# Patient Record
(demographics unavailable — no encounter records)

---

## 2025-05-22 NOTE — HISTORY OF PRESENT ILLNESS
[Never] : never [TextBox_4] : History obtained through [employer] . 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 Previously took prednisone, now no longer taking it. Has had recurrent bronchitis over the years.  symptoms include mucous production in her chest, 3 months ago had difficulty breathing went to [place] and they gave her ICS-LABA, fluticasone and albuterol.   Has nose bleeds with mucous, and sometimes coughs up blood tinged mucous this occurs when she is very congested. Today she is experiencing blood tinged mucous from her nose and coughing it up as well.   Has seasonal allergies. Has seen allergist and was skin tested- allergies to pollen, dust, feathers, dogs and cat hair. Waiting for insurance approval for desensitization therapy.   experiences wheezing at night, possible edema, when she walks quickly she has dyspnea and on climbing stairs.    Born, [facility], in  for [place] years.  Had asthma as a child. Had TB 20 years ago she was treated for many months with many pills, cannot name them. Has seen ENT- had surgery for sinusitis in the past.  ENT said sinuses were OK.   Works as a seamstress.

## 2025-05-22 NOTE — HISTORY OF PRESENT ILLNESS
[Never] : never [TextBox_4] : History obtained through [employer] . 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 Previously took prednisone, now no longer taking it. Has had recurrent bronchitis over the years.  symptoms include mucous production in her chest, 3 months ago had difficulty breathing went to [place] and they gave her ICS-LABA, fluticasone and albuterol.   Has nose bleeds with mucous, and sometimes coughs up blood tinged mucous this occurs when she is very congested. Today she is experiencing blood tinged mucous from her nose and coughing it up as well.   Has seasonal allergies. Has seen allergist and was skin tested- allergies to pollen, dust, feathers, dogs and cat hair. Waiting for insurance approval for desensitization therapy.   experiences wheezing at night, possible edema, when she walks quickly she has dyspnea and on climbing stairs.    Born, [facility], in  for 35 years.  Had asthma as a child. Had TB 20 years ago she was treated for many months with many pills, cannot name them. Has seen ENT- had surgery for sinusitis in the past.  ENT said sinuses were OK.   Works as a seamstress.

## 2025-05-22 NOTE — END OF VISIT
[Time Spent: ___ minutes] : I have spent [unfilled] minutes of time on the encounter which excludes teaching and separately reported services. [FreeTextEntry3] :  Total time spent caring for the patient today was 60   minutes.  This includes time spent before the visit reviewing the chart, during the visit speaking to the patient through a  and performing an examination, personally reviewing PFTS and time spent after the visit on documentation,

## 2025-05-22 NOTE — REVIEW OF SYSTEMS
[Nasal Congestion] : nasal congestion [Cough] : cough [Sputum] : sputum [Hay Fever] : hay fever [Negative] : Gastrointestinal

## 2025-05-22 NOTE — ASSESSMENT
[FreeTextEntry1] : 64-year-old woman with history of asthma as a child and recurrent bronchitis in the last few years.  She has a history of TB treated over 20 years ago with multiple medications for many months.  Also has a history of sinusitis.  Today comes in reporting frequent blood-tinged sputum on coughing and epistaxis as well.  She often wheezes and has exertional dyspnea on climbing stairs or walking quickly.  On physical exam today her vital signs are stable and oxygen saturation at rest on room air is 98% lungs are clear heart rate regular in rate and rhythm extremities without edema.  Pulmonary function tests today show a mild obstructive ventilatory impairment without bronchodilator response lung volumes show mild restriction with a normal DLCO.  Impression: Mild obstructive lung disease, with chronic productive cough and symptoms of hyperreactivity. She has a history of sinusitis.  Given history of pulm tuberculosis need to rule out significant scarring of bronchiectatic airways that could be causing her symptoms.  Plan: 1- sputum for AFB, C&S, fungus 2- continue fluticasone/salmeterol BID, fluticasone BID and albuterol as needed 3-labs today 4-CT with contrast to assess bronchial arteries given bleeding and lung parenchyma. F/U in one month..